# Patient Record
(demographics unavailable — no encounter records)

---

## 2025-01-28 NOTE — HISTORY OF PRESENT ILLNESS
[de-identified] : 16-year-old male presents with mother for evaluation of right ankle fracture. He was playing basketball on 1/25/25, jumped landed and rolled his ankle. He went to [facility] and had x-rays which demonstrated avulsion fracture of the medial talus. he was placed in a splint and given crutches. He has mild pain with ROM of the ankle. He has been taking Motrin with good relief. Reports prior ankle sprain.

## 2025-01-28 NOTE — PHYSICAL EXAM
[Slightly Antalgic] : slightly antalgic [LE] : Sensory: Intact in bilateral lower extremities [DP] : dorsalis pedis 2+ and symmetric bilaterally [PT] : posterior tibial 2+ and symmetric bilaterally [Normal] : Alert and in no acute distress [Poor Appearance] : well-appearing [Acute Distress] : not in acute distress [Obese] : not obese [de-identified] : The patient has no respiratory distress. Mood and affect are normal. The patient is alert and oriented to person, place and time. There is no pain with knee motion.  Calves are soft and nontender.  Both Achilles tendons are intact.  The right ankle is tender medially and laterally.  There is no instability.  He has pain with motion and mild to moderate swelling.  There is no tenderness of midfoot or forefoot.  There is no ecchymosis. [de-identified] : Procedure was performed at the [place] [id number] EXAM: ANKLE RT 3 VIEWS PROCEDURE DATE: 01/25/2025 INTERPRETATION: INDICATION: Right ankle pain TECHNIQUE: 3 views of the right ankle  COMPARISON: None available  FINDINGS:  Curvilinear ossific density adjacent to the medial talus on AP view is likely in keeping with sequelae of mildly displaced avulsion fracture. The joint spaces and alignment are preserved. There is no focal soft tissue abnormality.   IMPRESSION:  Curvilinear ossific density adjacent to the medial talus on AP view is likely in keeping with sequelae of mildly displaced avulsion fracture.  --- End of Report ---   LATISHA AQUINO-ISMAEL HENDERSON; Attending Radiologist This document has been electronically signed. Jan 25 2025 1:42PM

## 2025-01-28 NOTE — DISCUSSION/SUMMARY
[de-identified] : The patient has an avulsion injury to the medial talus in his right ankle.  I have gone over the x-rays and the injury with the patient and his mother.  This has the significance of a moderate sprain.  He will be treated with a lace up ankle brace, ice, elevation and crutches.  He will avoid weightbearing until the pain has receded.  He will be reevaluated in 2 weeks.

## 2025-02-18 NOTE — DISCUSSION/SUMMARY
[de-identified] : The patient's right ankle injury has healed well.  He may resume normal activities.  He will return as needed.

## 2025-02-18 NOTE — PHYSICAL EXAM
[LE] : Sensory: Intact in bilateral lower extremities [DP] : dorsalis pedis 2+ and symmetric bilaterally [PT] : posterior tibial 2+ and symmetric bilaterally [Normal] : Alert and in no acute distress [Poor Appearance] : well-appearing [Acute Distress] : not in acute distress [Obese] : not obese [de-identified] : The patient has no respiratory distress. Mood and affect are normal. The patient is alert and oriented to person, place and time. There is no pain with knee motion.  Calves are soft and nontender.  Both Achilles tendons are intact.  There is no tenderness of the right ankle.  There is no instability.  There is no tenderness of the foot.  Strength is good in all directions.  The skin is intact.  There is no lymphedema.

## 2025-02-18 NOTE — HISTORY OF PRESENT ILLNESS
[de-identified] : Mr. BLACK WYLIE  is a 16 year old male who presents to the office for a follow-up visit of his right ankle injury.  He is feeling much better.  He has no pain.  He is ambulating with the ankle brace and sneakers.